# Patient Record
(demographics unavailable — no encounter records)

---

## 2024-10-14 NOTE — PLAN
[FreeTextEntry1] : 31 y/o for IUD removal, with vaginitis  Plan: - Consent signed and witnessed, IUD removed - vaginitis panel collected - will f/u results

## 2024-10-14 NOTE — PLAN
[FreeTextEntry1] : 33 y/o for IUD removal, with vaginitis  Plan: - Consent signed and witnessed, IUD removed - vaginitis panel collected - will f/u results

## 2024-10-14 NOTE — PHYSICAL EXAM
[Chaperone Present] : A chaperone was present in the examining room during all aspects of the physical examination [02401] : A chaperone was present during the pelvic exam. [FreeTextEntry2] : Lila DE LA CRUZ [Appropriately responsive] : appropriately responsive [Alert] : alert [No Acute Distress] : no acute distress [Regular Rate Rhythm] : regular rate rhythm [Oriented x3] : oriented x3 [Labia Majora] : normal [Labia Minora] : normal [Normal] : normal [IUD String] : an IUD string was noted

## 2024-10-14 NOTE — PROCEDURE
[IUD Removal] : intrauterine device (IUD) removal [Time out performed] : Pre-procedure time out performed.  Patient's name, date of birth and procedure confirmed. [Consent Obtained] : Consent obtained [Risks] : risks [Benefits] : benefits [Alternatives] : alternatives [Patient] : patient [Speculum Placed] : speculum placed [Strings Visualized] : strings visualized [IUD Discarded] : IUD discarded [PRN] : as needed [de-identified] : Adverse side effects [General Vaginal Culture] : general vaginal culture [Tolerated Well] : the patient tolerated the procedure well [No Complications] : there were no complications

## 2024-10-14 NOTE — HISTORY OF PRESENT ILLNESS
[FreeTextEntry1] : 33 y/o presents for IUD removal Also complaining of vaginal irritation/discharge. Would like IUD removed because she feels like she has had more frequent BV infections. Declines other forms of hormonal contraception at this time.

## 2024-10-14 NOTE — PHYSICAL EXAM
[Chaperone Present] : A chaperone was present in the examining room during all aspects of the physical examination [35565] : A chaperone was present during the pelvic exam. [FreeTextEntry2] : Lila DE LA CRUZ [Appropriately responsive] : appropriately responsive [Alert] : alert [No Acute Distress] : no acute distress [Regular Rate Rhythm] : regular rate rhythm [Oriented x3] : oriented x3 [Labia Majora] : normal [Labia Minora] : normal [Normal] : normal [IUD String] : an IUD string was noted

## 2025-01-07 NOTE — PLAN
[FreeTextEntry1] : f/u w/ [facility] repeat protein level caution w/ ambulation obtain imaging results from hospital f/u w/ neuro for persistent forgetfulness, possible concussion

## 2025-01-07 NOTE — PHYSICAL EXAM
[No Acute Distress] : no acute distress [Normal Sclera/Conjunctiva] : normal sclera/conjunctiva [EOMI] : extraocular movements intact [Normal Outer Ear/Nose] : the outer ears and nose were normal in appearance [Normal] : normal rate, regular rhythm, normal S1 and S2 and no murmur heard [Coordination Grossly Intact] : coordination grossly intact [Normal Affect] : the affect was normal [Alert and Oriented x3] : oriented to person, place, and time [Normal Insight/Judgement] : insight and judgment were intact [28775 - Moderate Complexity requires multiple possible diagnoses and/or the management options, moderate complexity of the medical data (tests, etc.) to be reviewed, and moderate risk of significant complications, morbidity, and/or mortality as well as co] : Moderate Complexity
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
## 2025-01-21 NOTE — HISTORY OF PRESENT ILLNESS
[FreeTextEntry1] : 31 yo female needs paperwork completed for  c/o chronic lbp, participating in PT c/o left forearm round mass that's been there for a very long time c/o feeling sick w/ persistent cough, though now feeling better inquiring about gastric sleeve states ruptured rt armpit mass, now all better states wegovy's copay went up from $24 to $250

## 2025-01-21 NOTE — PHYSICAL EXAM
[No Acute Distress] : no acute distress [Normal Sclera/Conjunctiva] : normal sclera/conjunctiva [EOMI] : extraocular movements intact [Normal Outer Ear/Nose] : the outer ears and nose were normal in appearance [Normal Oropharynx] : the oropharynx was normal [Normal TMs] : both tympanic membranes were normal [No JVD] : no jugular venous distention [Normal] : normal rate, regular rhythm, normal S1 and S2 and no murmur heard [Normal Affect] : the affect was normal [Alert and Oriented x3] : oriented to person, place, and time [Normal Insight/Judgement] : insight and judgment were intact

## 2025-01-21 NOTE — PLAN
[FreeTextEntry1] : f/u w/ bariatric paperwork completed placing restrictions on strenuous activity cont PT cont rosa elena tea, hydrate, rest consider sono to confirm ganglion cyst vs f/u w/ surgeon

## 2025-01-21 NOTE — REVIEW OF SYSTEMS
----- Message from Alice Barron Cough] : cough [Negative] : Psychiatric [Shortness Of Breath] : no shortness of breath [Wheezing] : no wheezing

## 2025-04-02 NOTE — HISTORY OF PRESENT ILLNESS
[FreeTextEntry1] : 31 y/o presents with vaginal odor mostly after intercourse. Also reports discharge, irritation. Would also like STI screening

## 2025-04-02 NOTE — PHYSICAL EXAM
[MA] : MA [FreeTextEntry2] : Vonnie [Appropriately responsive] : appropriately responsive [Alert] : alert [No Acute Distress] : no acute distress [Regular Rate Rhythm] : regular rate rhythm [Oriented x3] : oriented x3 [Labia Majora] : normal [Labia Minora] : normal [Normal] : normal

## 2025-04-02 NOTE — PLAN
[FreeTextEntry1] : 31 y/o with vaginitis  Plan: - Vaginitis panel, STI screening - discussed ppx boric acid after intercourse, lifestyle modifications - will f/u results

## 2025-05-12 NOTE — HISTORY OF PRESENT ILLNESS
[FreeTextEntry1] : 31 y/o LMP 4/8/25 presents with AUB Reports heavy bleeding following her normal menses with large clots. Did not take a pregnancy test. Previously had regular menses and this has not happened to her before. Bleeding subsided on its own TVUS performed today in office, no focal endometrial lesions, other than a hemorrhagic ovarian cyst, unremarkable pelvic sono

## 2025-05-12 NOTE — PLAN
[FreeTextEntry1] : 33 y/o LMP 4/8/25 with AUB  Plan: - Pelvic sono reviewed, all questions answered - as isolated episode, will continue to monitor - urine HCG negative

## 2025-05-20 NOTE — PHYSICAL EXAM
[No Acute Distress] : no acute distress [Well-Appearing] : well-appearing [Normal Sclera/Conjunctiva] : normal sclera/conjunctiva [EOMI] : extraocular movements intact [Normal Outer Ear/Nose] : the outer ears and nose were normal in appearance [Normal Oropharynx] : the oropharynx was normal [Normal TMs] : both tympanic membranes were normal [No JVD] : no jugular venous distention [Normal] : normal rate, regular rhythm, normal S1 and S2 and no murmur heard [Coordination Grossly Intact] : coordination grossly intact [Normal Affect] : the affect was normal [Normal Insight/Judgement] : insight and judgment were intact [de-identified] : obese [de-identified] : injected nasal turbinates

## 2025-05-30 NOTE — PAST MEDICAL HISTORY
[FreeTextEntry1] : Dorothy stated she has no history of taking psychiatric medication, and was previously in therapy but briefly.

## 2025-05-30 NOTE — SOCIAL HISTORY
[FreeTextEntry1] : Dorothy is a 32 year-old of [place] background,  female, with one child (son, Fernando, age 12), in [employer] for 15 years. She has been in a long term relationship, which is committed but somewhat complicated by their careers. She currently works at [employer] in [place], in production control/admin. She stated she enjoys her work and has been doing this for 8 years. Dorothy is the oldest of three siblings (she has one brother who lives in [place], and one sister who is in college but will return to [place]).

## 2025-05-30 NOTE — REASON FOR VISIT
[Patient preference] : as per patient preference [Telehealth (audio & video) - Individual/Group] : This visit was provided via telehealth using real-time 2-way audio visual technology. [FreeTextEntry4] : 4:03pm [FreeTextEntry2] : emergency contact, Selwyn Westbrook (brother): [phone] [FreeTextEntry5] : English

## 2025-05-30 NOTE — PLAN
[Integrative Therapy] : Integrative Therapy  [Psychoeducation] : Psychoeducation  [Other: ____] : [unfilled] [de-identified] : Dorothy presented on time for this individual therapy session. Session focused on continued assessment of treatment goals and review of current stressors, including partner's imminent deployment and Dorothy's isolation for multiple reasons, including estrangement from former close friends. Writer provided support and psychoeducation as indicated and recommended integrative therapy for improved insight and emotional processing. Dorothy was receptive to these interventions. [FreeTextEntry1] : Continue in weekly individual therapy. Recommend integrative therapy to develop insight and process past traumas while also coping with current acute stressors including partner's deployment.

## 2025-05-30 NOTE — PLAN
[Admit to Program     (Add Program Admission information to a new column in the Admit/Discharge Flowsheet)] : Admit to program [FreeTextEntry4] : Admit to program and continue assessing symptoms to finalize diagnosis and recommendations for treatment.

## 2025-05-30 NOTE — PHYSICAL EXAM
[Average] : average [Cooperative] : cooperative [WNL] : within normal limits [FreeTextEntry8] : sad  about partner's imminent deployment [de-identified] : congruent with mood [de-identified] : seeking improvement in insight

## 2025-05-30 NOTE — SOCIAL HISTORY
[FreeTextEntry1] : Dorothy is a 32 year-old of [place] background,  female, with one child (son, Fernando, age 12), in [employer] for 15 years. She has been in a long term relationship, which is committed but somewhat complicated by their careers. 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 Dorothy is the oldest of three siblings (she has one brother who lives in [place], and one sister who is in college but will return to [place]).

## 2025-05-30 NOTE — PHYSICAL EXAM
[Average] : average [WNL] : within normal limits [Cooperative] : cooperative [FreeTextEntry8] : sad, anxious [de-identified] : tearful

## 2025-05-30 NOTE — REASON FOR VISIT
[Patient preference] : as per patient preference [Telehealth (audio & video) - Individual/Group] : This visit was provided via telehealth using real-time 2-way audio visual technology. [FreeTextEntry4] : 4:03pm [FreeTextEntry2] : emergency contact, Selwyn Westbrook (brother): [phone] [FreeTextEntry5] : English

## 2025-05-30 NOTE — HISTORY OF PRESENT ILLNESS
[FreeTextEntry1] : Dorothy presented for an intake assessment with interest in individual therapy to address symptoms of sadness/tearfulness, irritability, and sleep disturbance (loss of 2-3 hours of sleep per night). She noted that she attempts to avoid traffic and crowds as much as she can. Dorothy noted her partner encouraged her to seek therapy to address emotional challenges. Dorothy shared that she has experienced these symptoms since she was deployed with the [employer] two years ago to [place]. Dorothy shared she had social difficulties prior to her deployment and then lost her friend group completely as well as suffering problems in her romantic relationship of many years, which contributed to strong feelings of isolation. She also was away from her son (who stayed with her parents), which was an adjustment. Dorothy added that her partner is imminently deploying, which is a source of sadness and stress, but it is part of their lifestyles/career paths, which she accepts.   Dorothy  from her ex- in 2013 and finalized the divorce in 2014.

## 2025-05-30 NOTE — PHYSICAL EXAM
[Average] : average [WNL] : within normal limits [Cooperative] : cooperative [FreeTextEntry8] : sad, anxious [de-identified] : tearful
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
## 2025-06-09 NOTE — PHYSICAL EXAM
[Average] : average [Cooperative] : cooperative [WNL] : within normal limits [FreeTextEntry8] : sad about partner's deployment, irritable recently at work [de-identified] : congruent with mood [de-identified] : seeking improvement in insight

## 2025-06-09 NOTE — REASON FOR VISIT
[Telehealth (audio & video) - Individual/Group] : This visit was provided via telehealth using real-time 2-way audio visual technology. [Medical Office: ([facility] [place])___] : The provider was located at the medical office in [unfilled]. [Other Location: e.g. Home (Enter Location, City,State)___] : The patient, [unfilled], was located at [unfilled] at the time of the visit. [FreeTextEntry5] : 7pm [FreeTextEntry4] : 6pm

## 2025-06-22 NOTE — PHYSICAL EXAM
[Average] : average [Cooperative] : cooperative [Depressed] : depressed [Anxious] : anxious [WNL] : within normal limits [de-identified] : congruent with mood [de-identified] : seeking improvement in insight
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
## 2025-06-22 NOTE — REASON FOR VISIT
[Telehealth (audio & video) - Individual/Group] : This visit was provided via telehealth using real-time 2-way audio visual technology. [Medical Office: ([facility])___] : The provider was located at the medical office in [unfilled]. [Other Location: e.g. Home (Enter Location, City,State)___] : The patient, [unfilled], was located at [unfilled] at the time of the visit. [FreeTextEntry4] : 12:07pm [FreeTextEntry5] : 1pm

## 2025-07-16 NOTE — REASON FOR VISIT
[Telehealth (audio & video) - Individual/Group] : This visit was provided via telehealth using real-time 2-way audio visual technology. [Medical Office: ([facility])___] : The provider was located at the medical office in [unfilled]. [Other Location: e.g. Home (Enter Location, City,State)___] : The patient, [unfilled], was located at [unfilled] at the time of the visit. [FreeTextEntry4] : 4:05pm [FreeTextEntry5] : 5pm

## 2025-07-16 NOTE — PHYSICAL EXAM
[Average] : average [Cooperative] : cooperative [Irritable] : irritable [WNL] : within normal limits [de-identified] : congruent with mood [de-identified] : seeking improvement in insight
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
